# Patient Record
Sex: FEMALE | Race: WHITE | NOT HISPANIC OR LATINO | Employment: FULL TIME | URBAN - METROPOLITAN AREA
[De-identification: names, ages, dates, MRNs, and addresses within clinical notes are randomized per-mention and may not be internally consistent; named-entity substitution may affect disease eponyms.]

---

## 2018-07-18 ENCOUNTER — TELEPHONE (OUTPATIENT)
Dept: RHEUMATOLOGY | Facility: CLINIC | Age: 28
End: 2018-07-18

## 2018-07-18 NOTE — TELEPHONE ENCOUNTER
----- Message from Amisha Castro sent at 7/18/2018  9:45 AM CDT -----  Contact: Bella (mother)/ [phone]  Patient's mother called in to ask if patient can be seen sooner than next available appointment. Patient will be a new patient to you and she is being referred by her primary doctor.  Mother would like her to be seen in [place] if possible but she will be okay going to [facility] as well.    Please call and advise.

## 2018-07-23 ENCOUNTER — OFFICE VISIT (OUTPATIENT)
Dept: RHEUMATOLOGY | Facility: CLINIC | Age: 28
End: 2018-07-23
Payer: COMMERCIAL

## 2018-07-23 VITALS
BODY MASS INDEX: 45.35 KG/M2 | HEIGHT: 60 IN | SYSTOLIC BLOOD PRESSURE: 128 MMHG | HEART RATE: 99 BPM | WEIGHT: 231 LBS | DIASTOLIC BLOOD PRESSURE: 81 MMHG

## 2018-07-23 DIAGNOSIS — G47.9 SLEEP DISORDER: Primary | ICD-10-CM

## 2018-07-23 DIAGNOSIS — R76.8 ANA POSITIVE: ICD-10-CM

## 2018-07-23 PROCEDURE — 99999 PR PBB SHADOW E&M-EST. PATIENT-LVL III: CPT | Mod: PBBFAC,,, | Performed by: INTERNAL MEDICINE

## 2018-07-23 PROCEDURE — 99204 OFFICE O/P NEW MOD 45 MIN: CPT | Mod: S$GLB,,, | Performed by: INTERNAL MEDICINE

## 2018-07-23 PROCEDURE — 3008F BODY MASS INDEX DOCD: CPT | Mod: CPTII,S$GLB,, | Performed by: INTERNAL MEDICINE

## 2018-07-23 ASSESSMENT — ROUTINE ASSESSMENT OF PATIENT INDEX DATA (RAPID3)
PATIENT GLOBAL ASSESSMENT SCORE: 10
AM STIFFNESS SCORE: 0, NO
MDHAQ FUNCTION SCORE: 2
TOTAL RAPID3 SCORE: 5.55
FATIGUE SCORE: 10
PAIN SCORE: 0

## 2018-07-23 NOTE — PROGRESS NOTES
Chief Complaint   Patient presents with    Disease Management     russell positive       Patient was referred by     History of presenting illness    28 year old white female comes in with    Numbness in hands and arms  For a month  Stretching and moving helps it    Fatigue +  Needs a nap  Hard to wake up during the day  Coffee,vitamins dont help    Weight gain+    Anxiety and irritability  H/O anxiety off lexapro now,feels better     Sleeps fine at night  Wakes up fresh  Drowsy during the day    labs :    Vitamin d low 24  On supplements    RUSSELL 1: 80  Nucleolar     Hep c negative  Hep b sab +  CMP nml    TSH nml  Thyroid ab negative    CBC nml    CRP 15.2    EBV IGg 343    Ck nml    UA blood+  0-2 RBCs     No skin rashes,malar rash,photosensitivity  No telangiectasias  No calcinosis     No patchy alopecia  No oral and nasal ulcers  No sicca symptoms   No pleurisy or any cardiopulmonary complaints  No dysphagia,diplopia and dysphonia and muscle weakness  No n/v/d/c  No acid reflux+  No raynaud's+  No digital ulcers     No cytopenias  No renal issues    No blood clots     No fever,chills,weight loss and loss of appetite  Night sweats+     No pregnancy losses    Perimenstrual headaches+  No other neurologic issues    No joint pains    Past history : anxiety well controlled    Family history : grave's disease in maternal aunt  Seizures/DM/blood clots/brain tumor in mom    Social history : never been a smoker,alcohol user      Review of Systems   Constitutional: Negative for activity change, appetite change, chills, diaphoresis, fatigue, fever and unexpected weight change.   HENT: Negative for congestion, dental problem, drooling, ear discharge, ear pain, facial swelling, hearing loss, mouth sores, nosebleeds, postnasal drip, rhinorrhea, sinus pain, sinus pressure, sneezing, sore throat, tinnitus, trouble swallowing and voice change.    Eyes: Negative for photophobia, pain, discharge, redness, itching and visual  disturbance.   Respiratory: Negative for apnea, cough, choking, chest tightness, shortness of breath, wheezing and stridor.    Cardiovascular: Negative for chest pain, palpitations and leg swelling.   Gastrointestinal: Negative for abdominal distention, abdominal pain, anal bleeding, blood in stool, constipation, diarrhea, nausea, rectal pain and vomiting.   Endocrine: Negative for cold intolerance, heat intolerance, polydipsia, polyphagia and polyuria.   Genitourinary: Negative for decreased urine volume, difficulty urinating, dysuria, enuresis, flank pain, frequency, genital sores, hematuria and urgency.   Musculoskeletal: Negative for arthralgias, back pain, gait problem, joint swelling, myalgias, neck pain and neck stiffness.   Skin: Negative for color change, pallor, rash and wound.   Allergic/Immunologic: Negative for environmental allergies, food allergies and immunocompromised state.   Neurological: Negative for dizziness, tremors, seizures, syncope, facial asymmetry, speech difficulty, weakness, light-headedness, numbness and headaches.   Hematological: Negative for adenopathy. Does not bruise/bleed easily.   Psychiatric/Behavioral: Negative for agitation, behavioral problems, confusion, decreased concentration, dysphoric mood, hallucinations, self-injury, sleep disturbance and suicidal ideas. The patient is not nervous/anxious and is not hyperactive.      Physical Exam     KAPADIA-28 tender joint count: 0  KAPADIA-28 swollen joint count: 0    Physical Exam   Constitutional: She is oriented to person, place, and time and well-developed, well-nourished, and in no distress. No distress.   HENT:   Head: Normocephalic.   Mouth/Throat: Oropharynx is clear and moist.   Eyes: Conjunctivae are normal. Pupils are equal, round, and reactive to light. Right eye exhibits no discharge. Left eye exhibits no discharge. No scleral icterus.   Neck: Normal range of motion. No thyromegaly present.   Cardiovascular: Normal rate,  regular rhythm, normal heart sounds and intact distal pulses.    Pulmonary/Chest: Effort normal and breath sounds normal. No stridor.   Abdominal: Soft. Bowel sounds are normal.   Lymphadenopathy:     She has no cervical adenopathy.   Neurological: She is alert and oriented to person, place, and time.   Skin: Skin is warm. No rash noted. She is not diaphoretic.     Psychiatric: Affect and judgment normal.   Musculoskeletal: Normal range of motion.         Assessment     28 year old white female comes in with     -sleep issues  She is drowsy during the day  She has to take a nap to function  She oversleeps  -fatigue  -irritability    She is here since she has a positive VAHID 1: 80 nucleolar pattern    She has no symptoms of an autoimmune illness  She has no systemic lupus    I suspect she has sleep apnea which might be leading to fatigue,low energy and irritability    1. Sleep disorder    2. VAHID positive        Reviewed labs    New problem     Plan    Address sleep issues  Get sleep study    Address mood issues  Assess the need to go back on lexparo    Lose weight    PT,water aerobics,baljinder chi,yoga    Guerline was seen today for disease management.    Diagnoses and all orders for this visit:    Sleep disorder  -     Polysomnogram (CPAP will be added if patient meets diagnostic criteria.); Future  -     Polysomnogram (CPAP will be added if patient meets diagnostic criteria.)    VAHID positive        rtc prn

## 2018-11-19 DIAGNOSIS — N93.8 DUB (DYSFUNCTIONAL UTERINE BLEEDING): Primary | ICD-10-CM

## 2018-11-27 ENCOUNTER — HOSPITAL ENCOUNTER (OUTPATIENT)
Dept: RADIOLOGY | Facility: HOSPITAL | Age: 28
Discharge: HOME | End: 2018-11-27
Attending: SPECIALIST
Payer: COMMERCIAL

## 2018-11-27 DIAGNOSIS — N93.8 DUB (DYSFUNCTIONAL UTERINE BLEEDING): ICD-10-CM

## 2018-11-27 PROCEDURE — 76830 TRANSVAGINAL US NON-OB: CPT | Mod: 26,,, | Performed by: RADIOLOGY

## 2018-11-27 PROCEDURE — 76830 TRANSVAGINAL US NON-OB: CPT | Mod: TC

## 2018-11-27 PROCEDURE — 76856 US EXAM PELVIC COMPLETE: CPT | Mod: TC

## 2018-11-27 PROCEDURE — 76856 US EXAM PELVIC COMPLETE: CPT | Mod: 26,,, | Performed by: RADIOLOGY

## 2018-11-28 ENCOUNTER — LAB VISIT (OUTPATIENT)
Dept: LAB | Facility: HOSPITAL | Age: 28
End: 2018-11-28
Attending: SPECIALIST
Payer: COMMERCIAL

## 2018-11-28 DIAGNOSIS — N93.8 DYSFUNCTIONAL UTERINE BLEEDING: Primary | ICD-10-CM

## 2018-11-28 LAB
T4 FREE SERPL-MCNC: 1.12 NG/DL
TSH SERPL DL<=0.005 MIU/L-ACNC: 2.46 UIU/ML

## 2018-11-28 PROCEDURE — 36415 COLL VENOUS BLD VENIPUNCTURE: CPT

## 2018-11-28 PROCEDURE — 84443 ASSAY THYROID STIM HORMONE: CPT

## 2018-11-28 PROCEDURE — 84439 ASSAY OF FREE THYROXINE: CPT
